# Patient Record
Sex: FEMALE | Race: WHITE | NOT HISPANIC OR LATINO | URBAN - METROPOLITAN AREA
[De-identification: names, ages, dates, MRNs, and addresses within clinical notes are randomized per-mention and may not be internally consistent; named-entity substitution may affect disease eponyms.]

---

## 2017-12-22 ENCOUNTER — OUTPATIENT (OUTPATIENT)
Dept: OUTPATIENT SERVICES | Facility: HOSPITAL | Age: 72
LOS: 1 days | End: 2017-12-22
Payer: MEDICARE

## 2017-12-22 ENCOUNTER — APPOINTMENT (OUTPATIENT)
Dept: MRI IMAGING | Facility: HOSPITAL | Age: 72
End: 2017-12-22
Payer: MEDICARE

## 2017-12-22 DIAGNOSIS — M43.16 SPONDYLOLISTHESIS, LUMBAR REGION: ICD-10-CM

## 2017-12-22 DIAGNOSIS — M51.36 OTHER INTERVERTEBRAL DISC DEGENERATION, LUMBAR REGION: ICD-10-CM

## 2017-12-22 DIAGNOSIS — M51.26 OTHER INTERVERTEBRAL DISC DISPLACEMENT, LUMBAR REGION: ICD-10-CM

## 2017-12-22 DIAGNOSIS — M48.061 SPINAL STENOSIS, LUMBAR REGION WITHOUT NEUROGENIC CLAUDICATION: ICD-10-CM

## 2017-12-22 PROCEDURE — 72148 MRI LUMBAR SPINE W/O DYE: CPT

## 2017-12-22 PROCEDURE — 72148 MRI LUMBAR SPINE W/O DYE: CPT | Mod: 26

## 2018-02-23 ENCOUNTER — OUTPATIENT (OUTPATIENT)
Dept: OUTPATIENT SERVICES | Facility: HOSPITAL | Age: 73
LOS: 1 days | Discharge: HOME | End: 2018-02-23
Payer: MEDICARE

## 2018-02-23 DIAGNOSIS — M54.16 RADICULOPATHY, LUMBAR REGION: ICD-10-CM

## 2018-02-23 PROCEDURE — 77003 FLUOROGUIDE FOR SPINE INJECT: CPT

## 2018-02-23 PROCEDURE — 62323 NJX INTERLAMINAR LMBR/SAC: CPT

## 2018-03-16 ENCOUNTER — OUTPATIENT (OUTPATIENT)
Dept: OUTPATIENT SERVICES | Facility: HOSPITAL | Age: 73
LOS: 1 days | End: 2018-03-16
Payer: MEDICARE

## 2018-03-16 DIAGNOSIS — M54.16 RADICULOPATHY, LUMBAR REGION: ICD-10-CM

## 2018-03-16 PROCEDURE — 77003 FLUOROGUIDE FOR SPINE INJECT: CPT

## 2018-03-16 PROCEDURE — 64483 NJX AA&/STRD TFRM EPI L/S 1: CPT | Mod: LT

## 2018-03-16 PROCEDURE — 64484 NJX AA&/STRD TFRM EPI L/S EA: CPT | Mod: LT

## 2020-07-23 ENCOUNTER — APPOINTMENT (OUTPATIENT)
Dept: OBGYN | Facility: CLINIC | Age: 75
End: 2020-07-23
Payer: MEDICARE

## 2020-07-23 VITALS
TEMPERATURE: 97.1 F | HEART RATE: 78 BPM | HEIGHT: 61 IN | WEIGHT: 109 LBS | SYSTOLIC BLOOD PRESSURE: 140 MMHG | OXYGEN SATURATION: 98 % | BODY MASS INDEX: 20.58 KG/M2 | DIASTOLIC BLOOD PRESSURE: 80 MMHG

## 2020-07-23 DIAGNOSIS — Z87.39 PERSONAL HISTORY OF OTHER DISEASES OF THE MUSCULOSKELETAL SYSTEM AND CONNECTIVE TISSUE: ICD-10-CM

## 2020-07-23 DIAGNOSIS — Z80.3 FAMILY HISTORY OF MALIGNANT NEOPLASM OF BREAST: ICD-10-CM

## 2020-07-23 DIAGNOSIS — Z87.891 PERSONAL HISTORY OF NICOTINE DEPENDENCE: ICD-10-CM

## 2020-07-23 DIAGNOSIS — J45.909 UNSPECIFIED ASTHMA, UNCOMPLICATED: ICD-10-CM

## 2020-07-23 PROCEDURE — 99203 OFFICE O/P NEW LOW 30 MIN: CPT

## 2020-07-23 NOTE — REVIEW OF SYSTEMS
[Sight Problems] : sight problems [Dec Hearing] : decreased hearing [Sleep Disturbances] : sleep disturbances [Fever] : no fever [Chills] : no chills [Feeling Tired] : not feeling tired [Recent Wt Gain ___ Lbs] : no recent weight gain [Pain] : no pain [Redness] : no redness [Discharge] : no discharge [Nosebleeds] : no nosebleeds [Nasal Discharge] : no nasal discharge [Sore Throat] : no sore throat [Heart Rate Is Fast] : the heart rate was not fast [Chest Pain] : no chest pain [Palpitations] : no palpitations [Lower Ext Edema] : no lower extremity edema [Dyspnea] : no shortness of breath [Wheezing] : no wheezing [Cough] : no cough [SOB on Exertion] : no shortness of breath during exertion [Abdominal Pain] : no abdominal pain [Vomiting] : no vomiting [Constipation] : no constipation [Diarrhea] : no diarrhea [Heartburn] : no heartburn [Melena] : no melena [Dysuria] : no dysuria [Incontinence] : no incontinence [Pelvic Pain] : no pelvic pain [Abn Vag Bleeding] : no abnormal vaginal bleeding [Frequency] : no frequency [Urgency] : no urgency [Urethral Discharge] : no abnormal urethral discharge [Arthralgias] : no arthralgias [Joint Pain] : no joint pain [Joint Swelling] : no joint swelling [Joint Stiffness] : no joint stiffness [Skin Lesions] : no skin lesions [Change In A Mole] : no change in a mole [Breast Pain] : no breast pain [Breast Lump] : no breast lump [Convulsions] : no convulsions [Dizziness] : no dizziness [Fainting] : no fainting [Headache] : no headache [Anxiety] : no anxiety [Depression] : no depression [Hot Flashes] : no hot flashes [Muscle Weakness] : no muscle weakness [Deepening Voice] : no deepening of the voice [Easy Bleeding] : does not bleed easily [Easy Bruising] : does not bruise easily [Swollen Glands] : no swollen glands [Swollen Glands In The Neck] : no swollen glands in the neck [Feeling Weak] : no feelings of weakness

## 2020-07-23 NOTE — CHIEF COMPLAINT
[Initial Visit] : initial GYN visit [FreeTextEntry1] : CHeck up  Without complaint  Last gyn exam 7 years ago

## 2020-07-23 NOTE — PHYSICAL EXAM
[Awake] : awake [Alert] : alert [Oriented x3] : oriented to person, place, and time [No Lesions] : no genitalia lesions [Labia Minora] : labia minora [Labia Majora] : labia major [Atrophy] : atrophy [Normal] : clitoris [Dry Mucosa] : dry mucosa [No Bleeding] : there was no active vaginal bleeding [Pap Obtained] : a Pap smear was performed [Soft] :  the cervix was soft [Normal Position] : in a normal position [Uterine Adnexae] : were not tender and not enlarged [Nl Sphincter Tone] : normal sphincter tone [No Tenderness] : no rectal tenderness [Acute Distress] : no acute distress [LAD] : no lymphadenopathy [Thyroid Nodule] : no thyroid nodule [Goiter] : no goiter [Mass] : no breast mass [Nipple Discharge] : no nipple discharge [Axillary LAD] : no axillary lymphadenopathy [Tender] : non tender [Distended] : not distended [H/Smegaly] : no hepatosplenomegaly [Depressed Mood] : not depressed [Flat Affect] : affect not flat [Vulvar Atrophy] : no vulvar atrophy [Vulvitis] : no vulvitis [Labia Majora Erythema] : no erythema of the labia majora [Labia Minora Erythema] : no erythema of the labia minora [Erythema] : no erythema [Cystocele] : no cystocele [Rectocele] : no rectocele [Uterine Prolapse] : no uterine prolapse [Discharge] : had no discharge [Erosion] : had no erosions [Motion Tenderness] : there was no cervical motion tenderness [Tenderness] : nontender [Enlarged ___ wks] : not enlarged [Mass ___ cm] : no uterine mass was palpated [Adnexa Tenderness] : were not tender [Ovarian Mass (___ Cm)] : there were no adnexal masses [Internal Hemorrhoid] : no internal hemorrhoids [External Hemorrhoid] : no external hemorrhoids [FreeTextEntry4] : + vaginal stenosis [FreeTextEntry7] : Via rectal exam [FreeTextEntry9] : Last colonoscopy years ago

## 2020-07-23 NOTE — HISTORY OF PRESENT ILLNESS
[Normal Amount/Duration] : was of a normal amount and duration [Regular Cycle Intervals] : periods have been regular [Menarche Age: ____] : age at menarche was [unfilled] [Menopause Age: ____] : age at menopause was [unfilled] [Hot Flashes] : no hot flashes [Night Sweats] : no night sweats [Vaginal Itching] : no vaginal itching [Dyspareunia] : no dyspareunia [Mood Changes] : no mood changes [Headache] : no headache [Fatigue] : no fatigue [Weight Change] : no weight change [Irritability] : no irritability [Forgetfulness] : no forgetfulness [Loss of Libido] : no loss of libido [Depression] : no depression [Anxiety] : no anxiety [Sexually Active] : is not sexually active

## 2020-07-30 ENCOUNTER — OUTPATIENT (OUTPATIENT)
Dept: OUTPATIENT SERVICES | Facility: HOSPITAL | Age: 75
LOS: 1 days | End: 2020-07-30
Payer: MEDICARE

## 2020-07-30 ENCOUNTER — RESULT REVIEW (OUTPATIENT)
Age: 75
End: 2020-07-30

## 2020-07-30 ENCOUNTER — APPOINTMENT (OUTPATIENT)
Dept: RADIOLOGY | Facility: HOSPITAL | Age: 75
End: 2020-07-30
Payer: MEDICARE

## 2020-07-30 ENCOUNTER — APPOINTMENT (OUTPATIENT)
Dept: MAMMOGRAPHY | Facility: HOSPITAL | Age: 75
End: 2020-07-30
Payer: MEDICARE

## 2020-07-30 DIAGNOSIS — Z00.8 ENCOUNTER FOR OTHER GENERAL EXAMINATION: ICD-10-CM

## 2020-07-30 LAB
CYTOLOGY CVX/VAG DOC THIN PREP: ABNORMAL
HPV HIGH+LOW RISK DNA PNL CVX: NOT DETECTED

## 2020-07-30 PROCEDURE — 77063 BREAST TOMOSYNTHESIS BI: CPT | Mod: 26

## 2020-07-30 PROCEDURE — 77080 DXA BONE DENSITY AXIAL: CPT | Mod: 26

## 2020-07-30 PROCEDURE — 77080 DXA BONE DENSITY AXIAL: CPT

## 2020-07-30 PROCEDURE — 77067 SCR MAMMO BI INCL CAD: CPT

## 2020-07-30 PROCEDURE — 77067 SCR MAMMO BI INCL CAD: CPT | Mod: 26

## 2020-07-30 PROCEDURE — 77063 BREAST TOMOSYNTHESIS BI: CPT

## 2020-10-16 DIAGNOSIS — Z00.00 ENCOUNTER FOR GENERAL ADULT MEDICAL EXAMINATION W/OUT ABNORMAL FINDINGS: ICD-10-CM

## 2023-12-15 ENCOUNTER — APPOINTMENT (OUTPATIENT)
Dept: ULTRASOUND IMAGING | Facility: CLINIC | Age: 78
End: 2023-12-15

## 2023-12-15 ENCOUNTER — APPOINTMENT (OUTPATIENT)
Dept: MAMMOGRAPHY | Facility: CLINIC | Age: 78
End: 2023-12-15

## 2023-12-21 ENCOUNTER — APPOINTMENT (OUTPATIENT)
Dept: ULTRASOUND IMAGING | Facility: CLINIC | Age: 78
End: 2023-12-21
Payer: MEDICARE

## 2023-12-21 ENCOUNTER — APPOINTMENT (OUTPATIENT)
Dept: MAMMOGRAPHY | Facility: CLINIC | Age: 78
End: 2023-12-21
Payer: MEDICARE

## 2023-12-21 ENCOUNTER — APPOINTMENT (OUTPATIENT)
Dept: RADIOLOGY | Facility: CLINIC | Age: 78
End: 2023-12-21

## 2023-12-21 ENCOUNTER — OUTPATIENT (OUTPATIENT)
Dept: OUTPATIENT SERVICES | Facility: HOSPITAL | Age: 78
LOS: 1 days | End: 2023-12-21
Payer: MEDICARE

## 2023-12-21 DIAGNOSIS — Z12.31 ENCOUNTER FOR SCREENING MAMMOGRAM FOR MALIGNANT NEOPLASM OF BREAST: ICD-10-CM

## 2023-12-21 PROCEDURE — 77080 DXA BONE DENSITY AXIAL: CPT | Mod: 26

## 2023-12-21 PROCEDURE — 77080 DXA BONE DENSITY AXIAL: CPT

## 2023-12-21 PROCEDURE — 76641 ULTRASOUND BREAST COMPLETE: CPT | Mod: 26,50,3G

## 2023-12-21 PROCEDURE — 77066 DX MAMMO INCL CAD BI: CPT | Mod: 26

## 2023-12-21 PROCEDURE — G0279: CPT

## 2023-12-21 PROCEDURE — G0279: CPT | Mod: 26

## 2023-12-21 PROCEDURE — 76641 ULTRASOUND BREAST COMPLETE: CPT

## 2023-12-21 PROCEDURE — 77066 DX MAMMO INCL CAD BI: CPT

## 2023-12-25 ENCOUNTER — TRANSCRIPTION ENCOUNTER (OUTPATIENT)
Age: 78
End: 2023-12-25

## 2024-01-17 ENCOUNTER — APPOINTMENT (OUTPATIENT)
Dept: OBGYN | Facility: CLINIC | Age: 79
End: 2024-01-17
Payer: MEDICARE

## 2024-01-17 VITALS
DIASTOLIC BLOOD PRESSURE: 80 MMHG | WEIGHT: 94 LBS | SYSTOLIC BLOOD PRESSURE: 148 MMHG | HEIGHT: 60 IN | BODY MASS INDEX: 18.46 KG/M2

## 2024-01-17 DIAGNOSIS — Z01.419 ENCOUNTER FOR GYNECOLOGICAL EXAMINATION (GENERAL) (ROUTINE) W/OUT ABNORMAL FINDINGS: ICD-10-CM

## 2024-01-17 PROCEDURE — G0328 FECAL BLOOD SCRN IMMUNOASSAY: CPT | Mod: QW

## 2024-01-17 PROCEDURE — G0101: CPT

## 2024-01-21 LAB — HPV HIGH+LOW RISK DNA PNL CVX: NOT DETECTED

## 2024-01-27 LAB — CYTOLOGY CVX/VAG DOC THIN PREP: ABNORMAL

## 2024-01-28 NOTE — PLAN
[FreeTextEntry1] : #HCM -Breast self exam reviewed and taught -Pelvic exam done today -Pap/HPV today  -Mammogram UTD, next due 12/24 -Advised to get shingles vaccine, pneumovax, and flu shot   #FHx of brother and his son getting colonoscopy every year  -pt does not know reason -Advised pt to schedule colonoscopy -Referral to GI given   #Fhx of cancer -Advised to schedule pelvic sono (TVUS or transabdominal) -Advised to schedule genetic counseling appt w/ Dr. Tello  #Osteoporosis -DXA bone density done 01/24 showed osteoporosis -D/w pt increased calcium in diet & Vit D 1000 U intake, & weight-bearing exercise (i.e. walking) for 30 min daily -Advised to see endocrinologist, referrals given  RTO in 1 year for annual GYN exam or PRN for any GYN complaints LISA Dinero MD

## 2024-01-28 NOTE — HISTORY OF PRESENT ILLNESS
[No] : No [FreeTextEntry1] : AMY ASHRAF 78 year old female G0 LMP PM, h/o osteopenia, presents to re-establish gyn care   No VB. No vaginal discharge or vaginitis symptoms. She denies abdominal or pelvic pain. No urinary complaints. BM is normal per patient.  She reports osteopenia. H/o wrist and ankle fractures 2/2 falls. Pt reports not related to osteoporotic fractures.  She reports decreasing height 2/2 spondylolisthesis   OBHx: G0 GynHx: denies PMH: asthma, h/o cellulitis on leg, hard of hearing, osteopenia SHx: b/l hip replacement 2019, fractures wrist and ankle Meds: vitamins  All: Scopolamine (anaphylaxis)  Soc: daily alcohol (1 drink qd), tobacco(quit in 60's), Psych: negative FHx: father-heart disease, stroke. mother- breast cancer, brother and his son gets colonoscopy every year for unknown reason  (colon cancer)  [TextBox_4] : 2019 [facility] [place] - [place] nml [Mammogramdate] : 12/23 [TextBox_19] : BIRADS2 [BreastSonogramDate] : 12/23 [TextBox_25] : BIRADS2 [PapSmeardate] : 07/20 [TextBox_31] : NILM. HPVHR neg [BoneDensityDate] : 12/23 [TextBox_37] : Osteoporosis [TextBox_43] : Advised pt to schedule [FreeTextEntry2] : not currently active

## 2024-02-14 ENCOUNTER — APPOINTMENT (OUTPATIENT)
Dept: NUCLEAR MEDICINE | Facility: IMAGING CENTER | Age: 79
End: 2024-02-14
Payer: MEDICARE

## 2024-02-14 ENCOUNTER — OUTPATIENT (OUTPATIENT)
Dept: OUTPATIENT SERVICES | Facility: HOSPITAL | Age: 79
LOS: 1 days | End: 2024-02-14
Payer: MEDICARE

## 2024-02-14 DIAGNOSIS — R79.89 OTHER SPECIFIED ABNORMAL FINDINGS OF BLOOD CHEMISTRY: ICD-10-CM

## 2024-02-14 PROCEDURE — 78830 RP LOCLZJ TUM SPECT W/CT 1: CPT | Mod: 26,MH

## 2024-02-14 PROCEDURE — 78306 BONE IMAGING WHOLE BODY: CPT | Mod: MH

## 2024-02-14 PROCEDURE — 78830 RP LOCLZJ TUM SPECT W/CT 1: CPT | Mod: MH

## 2024-02-14 PROCEDURE — 78306 BONE IMAGING WHOLE BODY: CPT | Mod: 26,MH

## 2024-02-14 PROCEDURE — A9561: CPT

## 2024-03-25 ENCOUNTER — TRANSCRIPTION ENCOUNTER (OUTPATIENT)
Age: 79
End: 2024-03-25

## 2024-03-25 ENCOUNTER — NON-APPOINTMENT (OUTPATIENT)
Age: 79
End: 2024-03-25

## 2024-07-03 ENCOUNTER — NON-APPOINTMENT (OUTPATIENT)
Age: 79
End: 2024-07-03

## 2025-07-28 ENCOUNTER — EMERGENCY (EMERGENCY)
Facility: HOSPITAL | Age: 80
LOS: 1 days | End: 2025-07-28
Attending: STUDENT IN AN ORGANIZED HEALTH CARE EDUCATION/TRAINING PROGRAM
Payer: MEDICARE

## 2025-07-28 VITALS
TEMPERATURE: 98 F | OXYGEN SATURATION: 100 % | WEIGHT: 110.01 LBS | HEART RATE: 72 BPM | RESPIRATION RATE: 18 BRPM | SYSTOLIC BLOOD PRESSURE: 204 MMHG | DIASTOLIC BLOOD PRESSURE: 81 MMHG | HEIGHT: 60 IN

## 2025-07-28 PROCEDURE — 73080 X-RAY EXAM OF ELBOW: CPT | Mod: 26,RT

## 2025-07-28 PROCEDURE — 71045 X-RAY EXAM CHEST 1 VIEW: CPT

## 2025-07-28 PROCEDURE — 73080 X-RAY EXAM OF ELBOW: CPT

## 2025-07-28 PROCEDURE — 71045 X-RAY EXAM CHEST 1 VIEW: CPT | Mod: 26

## 2025-07-28 PROCEDURE — 70450 CT HEAD/BRAIN W/O DYE: CPT

## 2025-07-28 PROCEDURE — 70450 CT HEAD/BRAIN W/O DYE: CPT | Mod: 26

## 2025-07-28 PROCEDURE — 76377 3D RENDER W/INTRP POSTPROCES: CPT

## 2025-07-28 PROCEDURE — 99284 EMERGENCY DEPT VISIT MOD MDM: CPT | Mod: 25

## 2025-07-28 PROCEDURE — 72170 X-RAY EXAM OF PELVIS: CPT | Mod: 26

## 2025-07-28 PROCEDURE — 70486 CT MAXILLOFACIAL W/O DYE: CPT | Mod: 26

## 2025-07-28 PROCEDURE — 76377 3D RENDER W/INTRP POSTPROCES: CPT | Mod: 26

## 2025-07-28 PROCEDURE — 99284 EMERGENCY DEPT VISIT MOD MDM: CPT | Mod: GC

## 2025-07-28 PROCEDURE — 70486 CT MAXILLOFACIAL W/O DYE: CPT

## 2025-07-28 PROCEDURE — 72170 X-RAY EXAM OF PELVIS: CPT

## 2025-07-28 RX ORDER — ACETAMINOPHEN 500 MG/5ML
975 LIQUID (ML) ORAL ONCE
Refills: 0 | Status: COMPLETED | OUTPATIENT
Start: 2025-07-28 | End: 2025-07-28

## 2025-07-28 RX ADMIN — Medication 975 MILLIGRAM(S): at 23:44

## 2025-07-28 NOTE — ED PROVIDER NOTE - PATIENT PORTAL LINK FT
You can access the FollowMyHealth Patient Portal offered by [facility] by registering at the following website: [email or web address]. By joining [employer]’s FollowMyHealth portal, you will also be able to view your health information using other applications (apps) compatible with our system.

## 2025-07-28 NOTE — ED PROVIDER NOTE - NSFOLLOWUPINSTRUCTIONS_ED_ALL_ED_FT
YOU WERE SEEN IN THE ED FOR: Fall    You are found to have nose fracture on your CT scan.  Please follow-up with the ear nose and throat specialist.  Information call make an appointment within the next 1 week as provided.    FOR PAIN/FEVER, YOU MAY TAKE TYLENOL (acetaminophen) AND/OR IBUPROFEN (advil or motrin). FOLLOW THE INSTRUCTIONS ON THE LABEL/CONTAINER.    PLEASE FOLLOW UP WITH YOUR PRIVATE PHYSICIAN WITHIN THE NEXT 48 HOURS. BRING COPIES OF YOUR RESULTS.    1. Do NOT blow your nose OR pinch your nose for 2 weeks  2. Do NOT forcibly spit for 1 week  3. Do NOT use a straw for 1 week  4. Sneeze with your mouth OPEN  5. AVOID straining for 1 week  6. Do not smoke, use smokeless tobacco. Minimize exposure to second hand smoke (this impairs healing)  *It IS NORMAL to have/notice slight bleeding*     RETURN TO THE EMERGENCY DEPARTMENT IF YOU EXPERIENCE ANY NEW/CONCERNING/WORSENING SYMPTOMS SUCH AS BUT NOT LIMITED TO:  Double vision  Weakness  Inability to walk  Persistent nosebleeds.

## 2025-07-28 NOTE — ED PROVIDER NOTE - NSFOLLOWUPCLINICS_GEN_ALL_ED_FT
[facility] - ENT  Otolaryngology (ENT)  [address] [place] [address]  Phone: [phone]  Fax:

## 2025-07-28 NOTE — ED PROVIDER NOTE - OBJECTIVE STATEMENT
Pt is a 81yo female with a pmh of htn presenting today for a fall. Pt reports she was at PT for her shoulder when she tripped over a metal stool and fell onto her right arm and face. Pt struck her face and reports breaking her nose and a nurse who witnessed the event realigned it. She did not lose consciousness and is not taking blood thinners. She currently is having pain and bruising to the nasal bones and pain in her right elbow. She denies any prodromal dizziness, weakness, lightheadedness. Denies chest pain, palpitations, SOB, N/V/D.

## 2025-07-28 NOTE — ED PROVIDER NOTE - PROGRESS NOTE DETAILS
Pt ambulating and pain is well managed. Attending Cheikh Barajas:  Patient signed out to me, hematin stable here for mechanical fall, found to have bilateral nasal bone fracture without septal hematoma.  Steady unassisted gait.  Pain is controlled at this time.  All discussed with patient by ED team with strict return precautions discussed and verbal understanding expressed.  Safe for discharge home with outpatient PCP and ENT follow-up.

## 2025-07-28 NOTE — ED PROVIDER NOTE - ATTENDING CONTRIBUTION TO CARE
I performed a history and physical exam of the patient and discussed their management with the resident/fellow/student. I have reviewed the resident/fellow/student note and agree with the documented findings and plan of care, except as noted. I have personally performed a substantive portion of the visit including all aspects of the medical decision making. My medical decision making and observations are found above. Please refer to any progress notes for updates on clinical course. My notes supersedes the above resident/fellow/student note in case of discrepancy. aMhesh Joaquin DO (EM Attending)    80-year-old female history of hypertension presenting for mechanical fall.  Patient states she was at physical therapy and tripped over a metal stool falling into her right arm and face.  States that she "broke her nose" and there was a nurse that witnessed it and realigned her nose.  Patient not on blood thinners.  Denies losing consciousness.  States she is currently having pain localized to the nasal bone region and her right elbow.  Denies any neck pain, back pain, chest pain, shortness of breath, abdominal pain, nausea vomiting diarrhea.  Denies any prodromal symptoms of dizziness, chest pain, weakness or lightheadedness.  Accompanied by friend at bedside.    Mahesh Joaquin DO (EM Attending)   Physical Exam:    Gen: NAD, AOx3  Head: +ecchymosis localized to nasal bridge with ttp   HEENT: dried blood B/L nares without evidence of septal hematoma, EOMI, PEERLA  Lung: CTAB  CV: RRR  Abd: soft, NT, ND, no guarding, no rigidity, no rebound tenderness, no CVA tenderness   MSK: R elbow FROM withou visible deformities, ROM normal in LLE and BL LE, no midline ttp to entire spine, pelvis stable  Neuro: No focal sensory or motor deficits. Sensation intact to light touch all extremities.  Skin: Warm, well perfused, no rash, no leg swelling  Psych: normal affect    Vital signs stable, afebrile, not hypoxic. Plan for XR of R elbow, CXR, pelvis XR,  CTH and max/face  Differential diagnosis includes but not limited to fx vs. ICH vs. contusion  Final dispo pending pain control, imaging, close reassessments.

## 2025-07-28 NOTE — ED PROVIDER NOTE - NSDCPRINTRESULTS_ED_ALL_ED
Patient requests all Lab, Cardiology, and Radiology Results on their Discharge Instructions
Interview conducted with Pt/ at bedside who report Pt generally has a good appetite though decreased over the years 2/2 less ambulation and advancing age. UBW ~160-170lbs, current admission weight 180lbs noted with 2+ B/L LE edema. Provided written and verbal nutrition education 2/2 ESRD new to HD. Pt/ receptive,  primarily food shops and prepares meals. RD to remain available.

## 2025-07-28 NOTE — ED PROVIDER NOTE - PHYSICAL EXAMINATION
VITALS: reviewed  GEN: NAD, A & O x 4  HEAD/EYES: NCAT, EOMI, anicteric sclerae,   ENT: mucus membranes moist, oropharynx WNL, trachea midline, Ecchymosis noted over nasal bridge.  RESP: unlabored breathing  CV: distal pulses intact and symmetric bilaterally  MSK: extremities atraumatic and nontender, no edema, no asymmetry.   SKIN: warm, dry, no rash, no bruising, no cyanosis. color appropriate for ethnicity  NEURO: alert, mentating appropriately, no facial asymmetry.  PSYCH: Affect appropriate

## 2025-07-28 NOTE — ED PROVIDER NOTE - CLINICAL SUMMARY MEDICAL DECISION MAKING FREE TEXT BOX
[Left] : left shoulder [5 ___] : forward flexion 5[unfilled]/5 [5___] : internal rotation 5[unfilled]/5 [] : no atrophy See Attending Attestation - Mahesh Joaquin DO (EM Attending)

## 2025-07-29 VITALS
DIASTOLIC BLOOD PRESSURE: 106 MMHG | OXYGEN SATURATION: 97 % | HEART RATE: 82 BPM | TEMPERATURE: 98 F | RESPIRATION RATE: 16 BRPM | SYSTOLIC BLOOD PRESSURE: 193 MMHG

## 2025-07-29 NOTE — ED ADULT NURSE NOTE - ED CARDIAC RHYTHM
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 Lacks capacity, unable to communicate about her illness. Daughter Sue Barros wants her to go to [facility] for continuity of care.   regular

## 2025-07-29 NOTE — ED ADULT NURSE NOTE - NSFALLRISKINTERV_ED_ALL_ED

## 2025-07-29 NOTE — ED ADULT NURSE NOTE - OBJECTIVE STATEMENT
fall while at PT today; hit head, no  blood thinners; pt gets physical therapy for her shoulders; alert and oreinted x 3; swelling and bruising noted to nasal area.  fall while at PT today; hit head, no  blood thinners; pt gets physical therapy for her shoulders; alert and oriented x 3; swelling and bruising noted to nasal area; c/o right elbow pain; plan of care reviewed with pt and daughter at bedside.

## 2025-08-07 ENCOUNTER — APPOINTMENT (OUTPATIENT)
Dept: OTOLARYNGOLOGY | Facility: CLINIC | Age: 80
End: 2025-08-07